# Patient Record
Sex: MALE | Race: WHITE | NOT HISPANIC OR LATINO
[De-identification: names, ages, dates, MRNs, and addresses within clinical notes are randomized per-mention and may not be internally consistent; named-entity substitution may affect disease eponyms.]

---

## 2017-05-05 ENCOUNTER — CHARTING TRANS (OUTPATIENT)
Dept: OTHER | Age: 27
End: 2017-05-05

## 2018-11-03 VITALS
TEMPERATURE: 99.7 F | SYSTOLIC BLOOD PRESSURE: 110 MMHG | DIASTOLIC BLOOD PRESSURE: 80 MMHG | RESPIRATION RATE: 16 BRPM | HEART RATE: 76 BPM

## 2019-05-09 ENCOUNTER — HOSPITAL (OUTPATIENT)
Dept: OTHER | Age: 29
End: 2019-05-09
Attending: NURSE PRACTITIONER

## 2019-05-09 LAB
AMORPH SED URNS QL MICRO: ABNORMAL
ANALYZER ANC (IANC): ABNORMAL
ANION GAP SERPL CALC-SCNC: 13 MMOL/L (ref 10–20)
APPEARANCE UR: ABNORMAL
APPEARANCE UR: CLEAR
BACTERIA #/AREA URNS HPF: ABNORMAL /HPF
BASOPHILS # BLD: 0 THOUSAND/MCL (ref 0–0.3)
BASOPHILS NFR BLD: 1 %
BILIRUB UR QL STRIP: ABNORMAL
BILIRUB UR QL: NEGATIVE
BUN SERPL-MCNC: 11 MG/DL (ref 6–20)
BUN/CREAT SERPL: 10 (ref 7–25)
CALCIUM SERPL-MCNC: 9.1 MG/DL (ref 8.4–10.2)
CAOX CRY URNS QL MICRO: ABNORMAL
CHLORIDE: 99 MMOL/L (ref 98–107)
CO2 SERPL-SCNC: 27 MMOL/L (ref 21–32)
COLOR UR: YELLOW
COLOR UR: YELLOW
CREAT SERPL-MCNC: 1.13 MG/DL (ref 0.67–1.17)
CRP SERPL-MCNC: 2 MG/DL
DIFFERENTIAL METHOD BLD: ABNORMAL
EOSINOPHIL # BLD: 0 THOUSAND/MCL (ref 0.1–0.5)
EOSINOPHIL NFR BLD: 0 %
EPITH CASTS #/AREA URNS LPF: ABNORMAL /[LPF]
ERYTHROCYTE [DISTWIDTH] IN BLOOD: 12.7 % (ref 11–15)
FATTY CASTS #/AREA URNS LPF: ABNORMAL /[LPF]
GLUCOSE SERPL-MCNC: 109 MG/DL (ref 65–99)
GLUCOSE UR STRIP-MCNC: NEGATIVE MG/DL
GLUCOSE UR-MCNC: NEGATIVE MG/DL
GRAN CASTS #/AREA URNS LPF: ABNORMAL /[LPF]
HEMATOCRIT: 47.7 % (ref 39–51)
HEMOCCULT STL QL: NEGATIVE
HGB BLD-MCNC: 16.1 GM/DL (ref 13–17)
HGB UR QL: NEGATIVE
HYALINE CASTS #/AREA URNS LPF: ABNORMAL /[LPF]
IMM GRANULOCYTES # BLD AUTO: 0 THOUSAND/MCL (ref 0–0.2)
IMM GRANULOCYTES NFR BLD: 0 %
KETONES UR STRIP-MCNC: NEGATIVE MG/DL
KETONES UR-MCNC: NEGATIVE MG/DL
LEUKOCYTE ESTERASE UR QL STRIP: NEGATIVE
LEUKOCYTE ESTERASE UR QL STRIP: NEGATIVE
LIPASE SERPL-CCNC: 100 UNIT/L (ref 73–393)
LYMPHOCYTES # BLD: 0.6 THOUSAND/MCL (ref 1–4.8)
LYMPHOCYTES NFR BLD: 7 %
MCH RBC QN AUTO: 31 PG (ref 26–34)
MCHC RBC AUTO-ENTMCNC: 33.8 GM/DL (ref 32–36.5)
MCV RBC AUTO: 91.9 FL (ref 78–100)
MIXED CELL CASTS #/AREA URNS LPF: ABNORMAL /[LPF]
MONOCYTES # BLD: 1.2 THOUSAND/MCL (ref 0.3–0.9)
MONOCYTES NFR BLD: 15 %
MUCOUS THREADS URNS QL MICRO: PRESENT
NEUTROPHILS # BLD: 6.2 THOUSAND/MCL (ref 1.8–7.7)
NEUTROPHILS NFR BLD: 77 %
NEUTS SEG NFR BLD: ABNORMAL %
NITRITE UR QL STRIP: NEGATIVE
NITRITE UR QL: NEGATIVE
NRBC (NRBCRE): 0 /100 WBC
PH UR STRIP: 5.5 UNIT (ref 5–7)
PH UR: 5 UNIT (ref 5–7)
PLATELET # BLD: 175 THOUSAND/MCL (ref 140–450)
POTASSIUM SERPL-SCNC: 4 MMOL/L (ref 3.4–5.1)
PROT UR QL: 30 MG/DL
PROT UR STRIP-MCNC: 30 MG/DL
RBC # BLD: 5.19 MILLION/MCL (ref 4.5–5.9)
RBC #/AREA URNS HPF: ABNORMAL /HPF (ref 0–2)
RBC CASTS #/AREA URNS LPF: ABNORMAL /[LPF]
RENAL EPI CELLS #/AREA URNS HPF: ABNORMAL /[HPF]
SODIUM SERPL-SCNC: 135 MMOL/L (ref 135–145)
SP GR UR STRIP: 1.02 (ref 1–1.03)
SP GR UR: 1.03 (ref 1–1.03)
SPECIMEN SOURCE: ABNORMAL
SPERM URNS QL MICRO: ABNORMAL
SQUAMOUS #/AREA URNS HPF: ABNORMAL /HPF (ref 0–5)
T VAGINALIS URNS QL MICRO: ABNORMAL
TRI-PHOS CRY URNS QL MICRO: ABNORMAL
URATE CRY URNS QL MICRO: ABNORMAL
URINE REFLEX: ABNORMAL
URNS CMNT MICRO: ABNORMAL
UROBILINOGEN UR QL: 0.2 MG/DL (ref 0–1)
UROBILINOGEN UR STRIP-MCNC: 0.2 MG/DL (ref 0–1)
WAXY CASTS #/AREA URNS LPF: ABNORMAL /[LPF]
WBC # BLD: 8.1 THOUSAND/MCL (ref 4.2–11)
WBC #/AREA URNS HPF: ABNORMAL /HPF (ref 0–5)
WBC CASTS #/AREA URNS LPF: ABNORMAL /[LPF]
YEAST HYPHAE URNS QL MICRO: ABNORMAL
YEAST URNS QL MICRO: ABNORMAL

## 2019-10-22 ENCOUNTER — OFFICE VISIT (OUTPATIENT)
Dept: URGENT CARE | Facility: CLINIC | Age: 29
End: 2019-10-22
Payer: COMMERCIAL

## 2019-10-22 VITALS
RESPIRATION RATE: 14 BRPM | HEART RATE: 65 BPM | TEMPERATURE: 99 F | OXYGEN SATURATION: 99 % | HEIGHT: 73 IN | SYSTOLIC BLOOD PRESSURE: 131 MMHG | BODY MASS INDEX: 23.86 KG/M2 | WEIGHT: 180 LBS | DIASTOLIC BLOOD PRESSURE: 76 MMHG

## 2019-10-22 DIAGNOSIS — Z20.2 POSSIBLE EXPOSURE TO STD: Primary | ICD-10-CM

## 2019-10-22 LAB
BILIRUB UR QL STRIP: NEGATIVE
C TRACH DNA SPEC QL NAA+PROBE: DETECTED
GLUCOSE UR QL STRIP: NEGATIVE
KETONES UR QL STRIP: POSITIVE
LEUKOCYTE ESTERASE UR QL STRIP: NEGATIVE
N GONORRHOEA DNA SPEC QL NAA+PROBE: NOT DETECTED
PH, POC UA: 6 (ref 5–8)
POC BLOOD, URINE: NEGATIVE
POC NITRATES, URINE: NEGATIVE
PROT UR QL STRIP: NEGATIVE
SP GR UR STRIP: 1.02 (ref 1–1.03)
UROBILINOGEN UR STRIP-ACNC: NORMAL (ref 0.3–2.2)

## 2019-10-22 PROCEDURE — 99000 SPECIMEN HANDLING OFFICE-LAB: CPT | Mod: S$GLB,,, | Performed by: EMERGENCY MEDICINE

## 2019-10-22 PROCEDURE — 86703 HIV-1/HIV-2 1 RESULT ANTBDY: CPT

## 2019-10-22 PROCEDURE — 87491 CHLMYD TRACH DNA AMP PROBE: CPT

## 2019-10-22 PROCEDURE — 99000 PR SPECIMEN HANDLING,DR OFF->LAB: ICD-10-PCS | Mod: S$GLB,,, | Performed by: EMERGENCY MEDICINE

## 2019-10-22 PROCEDURE — 87340 HEPATITIS B SURFACE AG IA: CPT

## 2019-10-22 PROCEDURE — 81003 URINALYSIS AUTO W/O SCOPE: CPT | Mod: QW,S$GLB,, | Performed by: EMERGENCY MEDICINE

## 2019-10-22 PROCEDURE — 86592 SYPHILIS TEST NON-TREP QUAL: CPT

## 2019-10-22 PROCEDURE — 99203 PR OFFICE/OUTPT VISIT, NEW, LEVL III, 30-44 MIN: ICD-10-PCS | Mod: S$GLB,,, | Performed by: EMERGENCY MEDICINE

## 2019-10-22 PROCEDURE — 87086 URINE CULTURE/COLONY COUNT: CPT

## 2019-10-22 PROCEDURE — 81003 POCT URINALYSIS, DIPSTICK, AUTOMATED, W/O SCOPE: ICD-10-PCS | Mod: QW,S$GLB,, | Performed by: EMERGENCY MEDICINE

## 2019-10-22 PROCEDURE — 99203 OFFICE O/P NEW LOW 30 MIN: CPT | Mod: S$GLB,,, | Performed by: EMERGENCY MEDICINE

## 2019-10-22 PROCEDURE — 3008F BODY MASS INDEX DOCD: CPT | Mod: CPTII,S$GLB,, | Performed by: EMERGENCY MEDICINE

## 2019-10-22 PROCEDURE — 86803 HEPATITIS C AB TEST: CPT

## 2019-10-22 PROCEDURE — 36415 PR COLLECTION VENOUS BLOOD,VENIPUNCTURE: ICD-10-PCS | Mod: S$GLB,,, | Performed by: EMERGENCY MEDICINE

## 2019-10-22 PROCEDURE — 36415 COLL VENOUS BLD VENIPUNCTURE: CPT | Mod: S$GLB,,, | Performed by: EMERGENCY MEDICINE

## 2019-10-22 PROCEDURE — 3008F PR BODY MASS INDEX (BMI) DOCUMENTED: ICD-10-PCS | Mod: CPTII,S$GLB,, | Performed by: EMERGENCY MEDICINE

## 2019-10-22 RX ORDER — DEXTROAMPHETAMINE SACCHARATE, AMPHETAMINE ASPARTATE MONOHYDRATE, DEXTROAMPHETAMINE SULFATE AND AMPHETAMINE SULFATE 2.5; 2.5; 2.5; 2.5 MG/1; MG/1; MG/1; MG/1
10 CAPSULE, EXTENDED RELEASE ORAL EVERY MORNING
COMMUNITY

## 2019-10-22 RX ORDER — AZITHROMYCIN 500 MG/1
500 TABLET, FILM COATED ORAL ONCE
Qty: 2 TABLET | Refills: 0 | Status: SHIPPED | OUTPATIENT
Start: 2019-10-22 | End: 2019-10-22

## 2019-10-22 NOTE — PATIENT INSTRUCTIONS
Reji Zamorano MD  Go to the Emergency Department for any problems  Call your PCP for follow up next available.    Chlamydia Trachomatis (Urine)  Does this test have other names?  No.  What is this test?  This test looks for Chlamydia trachomatis bacteria in a sample of cells collected by your healthcare provider.  C. trachomatis bacteria cause chlamydia. Chlamydia is the most common sexually transmitted disease (STD) in the [place]  The CDC recommends that sexually active women 25 and younger be screened once a year for chlamydia. That's because as many as half of women who get chlamydia don't have any symptoms. Men should be tested as soon as they have symptoms or if their partners are diagnosed with chlamydia.  In women, chlamydia may lead to cervicitis, an inflammation and swelling of the cervix. If it isn't treated, it can lead to serious sexual health problems, including infertility.  In men, chlamydia can cause urethritis. This is a swelling of the urethra and possibly blood in the urine. Babies born to infected mothers can get pneumonia or conjunctivitis. The mothers can get endometriosis later on.  Chlamydia can be effectively treated with antibiotics.  Why do I need this test?  You may have this test if you are a sexually active woman 25 or younger or a man whose partner has been diagnosed with chlamydia.  When symptoms occur in women, they can include:  · Abnormal vaginal bleeding or discharge  · Stomach pain  · Pain during sex  · Pain when passing urine  When symptoms occur in men, they can include:  · Watery discharge from your penis that's not urine  · Pain when passing urine  · Swollen scrotum  · Painful sensation in your testicles  What other tests might I have along with this test?  Your healthcare provider may also order other tests because chlamydia symptoms can be confused with symptoms of other STDs. These STDs include:  · Gonorrhea   · HIV/AIDS  · Hepatitis B  · Trichomoniasis  · Syphilis  What do  "my test results mean?  Many things may affect your lab test results. These include the method each lab uses to do the test. Even if your test results are different from the normal value, you may not have a problem. To learn what the results mean for you, talk with your healthcare provider.  Normal results are negative, meaning that no chlamydia cells were found in your urine.  A positive result means that chlamydia bacteria were found and that you are likely infected with the disease.  How is this test done?  This test requires a "first void" urine sample: You will collect your first urine in the morning after you wake up.  Does this test pose any risks?  This test poses no known risks.  What might affect my test results?  Other factors aren't likely to affect your results.  How do I get ready for this test?  You don't need to prepare for this test.     © 0967-4601 [employer]. [address] [place] [address]. All rights reserved. This information is not intended as a substitute for professional medical care. Always follow your healthcare professional's instructions.        Gonorrhea (Urine)  Does this test have other names?  Neisseria gonorrhoeae, N. gonorrhoeae  What is this test?  This urine test helps find out whether you are infected with gonorrhea. Gonorrhea is a common sexually transmitted disease (STD). Symptoms of gonorrhea may be mild at first, but the infection can be serious if not treated. It can damage organs, cause infertility in women and some men, and even lead to a life-threatening bacterial infection.  Cases of gonorrhea have declined in the [place] in recent years. But it remains a concern because some bacteria have become resistant to common antibiotics. Risk factors for gonorrhea include having unprotected oral sex, rectal sex, or sex with a partner who has gonorrhea. Other risks include having multiple sexual partners, a new sexual partner, or a gonorrhea infection in the " past.   Gonorrhea is easily treated with antibiotics. A one-time dose generally cures the infection in both men and women.   Why do I need this test?  You may have this test if your healthcare provider suspects that you have gonorrhea. The symptoms of gonorrhea depend on where you have the infection. In both men and women, gonorrhea can occur in the urethra, where urine comes out. Or it can occur in the rectum or in the throat.  Gonorrhea is easily cured, but it can be dangerous and even life-threatening if not treated. Among other things, it can cause a potentially fatal blood infection in women and men. It often causes pelvic inflammatory disease in women, which can lead to infertility and chronic pelvic pain. In men the infection often affects the prostate. It also often affects the epididymis, the ducts attached to the testicles. It can also cause infertility in men.  In women, symptoms of gonorrhea include:  · Increased vaginal discharge  · Burning sensation when urinating  · Bleeding or spotting between periods  · Pelvic pain  In men, symptoms of gonorrhea include:  · Green, white, or yellow discharge from the penis  · Burning sensation when urinating  · Painful or swollen testicles  In both men and women, symptoms of anal gonorrhea include anal itching, soreness, bleeding, and painful bowel movements. Most women with gonorrhea do not have any symptoms. Even when a woman has symptoms, they are often mild. They can be mistaken for a bladder or vaginal infection. Some men with gonorrhea may have no symptoms at all.    If you're pregnant, you may have this test as part of routine prenatal testing. A pregnant woman who has gonorrhea can pass the infection to her baby during delivery. This may cause blindness or a potentially fatal blood infection. Finding and treating gonorrhea prevents such problems.   What other tests might I have along with this test?  You may also be tested for other STDs,  including:  · Chlamydia  · Hepatitis B  · HIV  · Syphilis  · Trichomoniasis   What do my test results mean?  Many things may affect your lab test results. These include the method each lab uses to do the test. Even if your test results are different from the normal value, you may not have a problem. To learn what the results mean for you, talk with your healthcare provider.   How is this test done?  This test requires a urine sample. The sample is usually collected by urinating in a specimen cup at your healthcare provider's office.   Does this test pose any risks?  This test poses no known risks.  What might affect my test results?  If you use genital lubricants or disinfectants before the test, your results might not be accurate.  How do I get ready for this test?  Ask your healthcare provider how to prepare for this test. In addition, be sure your provider knows about all medicines, herbs, vitamins, and supplements you are taking. This includes medicines that don't need a prescription and any illicit drugs you may use.   © 3039-4169 [employer]. [address] [place] [address]. All rights reserved. This information is not intended as a substitute for professional medical care. Always follow your healthcare professional's instructions.        What Are Sexually Transmitted Diseases (STDs)?  A sexually transmitted disease (STD) is a disease that is spread during sex. (An STD can also be called STI for sexually transmitted infection.) You can become infected with an STD if you have sex with someone who has an STD. Any sex that involves the penis, vagina, anus, or mouth can spread disease. Some STDs spread through body fluids such as semen, vaginal fluid, or blood. Others spread through contact with affected skin.  Who is at risk?     Places on or in the body where STDs cause damage include reproductive organs, the rectum, and the mouth.   It doesnt matter if youre straight or dodge, male or  female, young or old. Any person who has sex can get an STD. Your risk increases if:  · You have more than one partner. The more partners you have, the greater your risk.  · Your partner has other partners. If your partner is exposed to an STD, you could be, too.  · You or your partner have had sex with other people in the past. Either of you might be carrying an STD from an earlier partner.  · You have an STD. The STD may cause sores or other health problems that increase your risk of new infections. Your risk will stay high unless you change the behaviors that put you at risk of the current infection.  Prevent future problems  Left untreated, certain STDs can lead to cancer or even death. Some can harm unborn babies whose mothers are infected. Others can cause you to not be able to have children (sterility) or can affect changes in behavior or your ability to think. You can prevent these problems with safer sex, regular checkups, and early treatment. Always use a latex condom when you have sex. Get tested if youre at risk. And get treated early if you have an STD.  Getting checked  The only sure way to know if you have an STD is to get checked by a healthcare provider. If you notice a change in how your body looks or feels, have it checked out. But keep in mind, STDs dont always show symptoms. So if youre at risk of STDs, get checked regularly. If you find you have an STD, be sure your partner gets treatment, too. If not, his or her health is at risk. And left untreated, your partner could pass the STD back to you, or on to others.  Common symptoms  Be alert to any changes in your body and your partners body. Symptoms may appear in or near the vagina, penis, rectum, mouth, or throat. They include:  · Unusual discharge  · Lumps, bumps, or rashes  · Sores that may be painful, itchy, or painless  · Itchy skin  · Burning with urination  · Pain in the pelvis, belly (abdomen), or rectum  Even if you dont have  symptoms  You may have an STD, even if you dont have symptoms. If you think you are at risk, get checked. Go to a clinic or to your healthcare provider. If your partner has an STD, you need to be tested too, even if you feel fine.  Vaccines to prevent disease  Vaccines (also called immunizations) are available to prevent hepatitis A and hepatitis B. These are two kinds of STDs. There is also a vaccine to prevent HPV. This is a virus that can be passed from person to person through sexual contact. Ask your healthcare provider whether any of these vaccines is right for you.   Date Last Reviewed: 11/1/2016  © 4190-7277 [employer]. [address] [place] [address], [place] [address]. All rights reserved. This information is not intended as a substitute for professional medical care. Always follow your healthcare professional's instructions.        If You Think You Have an STD  Treating a sexually transmitted disease (STD) early limits the problems they can cause. If you have an STD, get treated right away. Ask your partner to be tested, too. Then avoid sex until youve finished treatment and your healthcare provider says its OK to have sex again.    Follow your treatment plan  Treatment depends on the type of STD you have. Common treatments include injections and oral pills or liquids. Creams and gels can be applied to sores caused by certain STDs. Follow the tips below:  · Get new treatment for each new STD.  · Dont use old medicine, even for the same STD. Use medicines as directed.  · Dont share medicine unless instructed to do so by your healthcare provider or clinic.  Talk to your partner  If you have an STD, its your duty to tell all your recent partners so they can be tested and treated. This is one important way to prevent the disease from being spread. Telling a partner that you have an STD can be hard. You may be embarrassed, angry, or afraid. Its often unclear who had the STD first. So try not to  place blame. Your healthcare provider may offer some advice on how to begin.  Prevent future problems  Even after youve been treated, you can still be infected again. This is a common problem. It happens when a partner passes the STD back to you. To avoid this, your partner must be tested. He or she may also need treatment. After treatment, go to any scheduled follow-up visits. Then prevent future problems with safer sex. Limit your number of partners and always use a latex condom.  Diagnosing STDS  Your healthcare provider will take a health history and examine you. During your health history, you will be asked about your sex habits and health history. You may also be asked about drug use. Give honest answers. Your healthcare provider will then check your body for signs of STDs. He or she also may perform one or more of the following tests:  · Fluid is swabbed from any sores. Samples also may be taken from the vagina, penis, mouth, or rectum. The samples are then tested for STDs.  · Blood or urine samples may be taken. They are checked for viruses or bacteria that cause STDs.  · For women, cells from the cervix (where the vagina and uterus meet) are checked for signs of cancer. This is called a Pap smear. If cell changes are found, a magnifying scope may be used to take a closer look (colposcopy).   Date Last Reviewed: 12/1/2016 © 2000-2017 [employer]. [address] [place] [address]. All rights reserved. This information is not intended as a substitute for professional medical care. Always follow your healthcare professional's instructions.        Understanding STDs    When it comes to sex, nothing is risk-free. Any sexual contact with the penis, vagina, anus, or mouth can spread a sexually transmitted disease (STD). The only sure way to prevent STDs is abstinence (not having sex). But there are ways to make sex safer. Use a latex condom each time you have sex. And choose your partner  wisely.  Use condoms for safer sex  If you have sex, latex condoms provide the best protection against STDs. Latex condoms stop the exchange of body fluids that carry certain STDs. They also limit contact with affected skin. Be aware though, a condom doesnt cover all skin. So, affected skin that is not covered can still transfer disease. But youre safer with a condom than without one. Use a condom even if you use other birth control. While birth control methods like the pill or IUD help prevent pregnancy, they do not protect against STDs.  Choose the right condom  Condoms made of latex prevent disease best. If youre allergic to latex, use polyurethane condoms instead. Male condoms fit over the penis. Female condoms line the vagina. Before buying a condom, read the label to be sure it prevents disease. Some novelty condoms dont.  The right lubricant helps  Buy lubricated condoms or use lubricant. This provides greater comfort and reduces the risk of condom breakage. Use only water-based lubricants. Dont use oil, lotion, or petroleum jelly. They can weaken the condom, causing breakage. Also, you may want to choose lubricants without nonoxynol-9. Its now known that this spermicide does not prevent disease and may cause irritation.  Use condoms correctly  For condoms to work, they must be used the right way. Keep these tips in mind:  · Use a new latex condom each time you have sex. Slip the condom on the penis before any contact is made.  · When ready to withdraw, hold the rim of the condom as the penis pulls out. This prevents the condom from slipping off.  · Check the expiration date before using a condom.  · Dont store condoms in places that can get hot, such as a car or a wallet that is carried in a back pocket.  Get to know your partner  Safer sex is a process. It involves getting to know your partner and making informed choices. Ask each other how many partners you have had in the past, and how many you  have now. Find out if either of you has an STD. If you decide to have sex, use a condom each time. Dont stop using condoms unless youre sure neither of you has other partners and youve both been tested to confirm you dont have STDs. Then stay free of disease by having sex only with each other (monogamy).  Keep your cool  Dont let alcohol or drugs cloud your judgment. They could lead you to have sex with someone you wouldnt have chosen if you were sober. Or, you might forget to use a condom. If you do plan to have sex, keep a latex condom with you. Dont wait until youre in the heat of passion to try to find one.  Consider abstinence  The only way to be sure you wont get an STD is to abstain from sex. Abstinence is a choice that many people make at some point in their lives. Maybe you want to wait until you are sure youre ready before you have sex. Maybe youd like a break from the responsibilities of sex for a while. Or maybe you just want to know your partner better before taking the next step. Abstinence is a choice you can make now to protect your future.  Date Last Reviewed: 12/1/2016  © 9619-3808 [employer]. [address] [place] [address], [place] [address]. All rights reserved. This information is not intended as a substitute for professional medical care. Always follow your healthcare professional's instructions.

## 2019-10-22 NOTE — PROGRESS NOTES
"Subjective:       Patient ID: Brandin Berg is a 29 y.o. male.    Vitals:  height is 6' 1" (1.854 m) and weight is 81.6 kg (180 lb). His oral temperature is 98.8 °F (37.1 °C). His blood pressure is 131/76 and his pulse is 65. His respiration is 14 and oxygen saturation is 99%.     Chief Complaint: Exposure to STD    Patient was exposed to Chlamydia.  Received a phone call from his girl friend, denies hx STI, no penile pain/DC/dysuria, no symptoms.    Exposure to STD   The patient's pertinent negatives include no genital injury, genital itching, genital lesions, pelvic pain, penile discharge, penile pain, priapism, scrotal swelling or testicular pain. This is a new problem. The current episode started today. The problem occurs rarely. The problem has been unchanged. The patient is experiencing no pain. Pertinent negatives include no abdominal pain, anorexia, chest pain, chills, constipation, coughing, diarrhea, discolored urine, dysuria, fever, flank pain, frequency, headaches, hematuria, hesitancy, joint pain, joint swelling, nausea, painful intercourse, rash, shortness of breath, sore throat, urgency, urinary retention or vomiting. Nothing aggravates the symptoms. He has tried nothing for the symptoms. He is sexually active. Yes, his partner has an STD. There is no history of BPH, chlamydia, cryptorchidism, erectile aid use, erectile dysfunction, a femoral hernia, gonorrhea, herpes simplex, HIV, an inguinal hernia, kidney stones, prostatitis, sickle cell disease, syphilis or varicocele.       Constitution: Negative for chills and fever.   HENT: Negative for sore throat.    Neck: Negative for painful lymph nodes.   Cardiovascular: Negative for chest pain.   Respiratory: Negative for cough and shortness of breath.    Gastrointestinal: Negative for abdominal pain, nausea, vomiting, constipation and diarrhea.   Genitourinary: Negative for dysuria, frequency, urgency, urine decreased, flank pain, hematuria, history of " kidney stones, genital trauma, painful intercourse, genital sore, penile discharge, painful ejaculation, penile pain, penile swelling, scrotal swelling, testicular pain and pelvic pain.   Musculoskeletal: Negative for back pain.   Skin: Negative for rash and lesion.   Neurological: Negative for headaches.   Hematologic/Lymphatic: Negative for swollen lymph nodes.       Objective:      Physical Exam   Constitutional: He is oriented to person, place, and time. He appears well-developed and well-nourished.   HENT:   Head: Normocephalic and atraumatic.   Cardiovascular: Normal rate, regular rhythm and normal heart sounds.   Pulmonary/Chest: Breath sounds normal.   Abdominal: Soft. There is no tenderness.   Genitourinary:   Genitourinary Comments: Deferred per pt request   Musculoskeletal: Normal range of motion.   Neurological: He is alert and oriented to person, place, and time.   Skin: Skin is warm and dry.   Psychiatric: He has a normal mood and affect. His behavior is normal.         Assessment:       1. Possible exposure to STD        Plan:         Possible exposure to STD  -     Urine culture  -     POCT Urinalysis, Dipstick, Automated, W/O Scope  -     C. trachomatis/N. gonorrhoeae by AMP DNA  -     Hepatitis B surface antigen  -     Hepatitis C antibody  -     HIV 1/2 Ag/Ab (4th Gen)  -     RPR  -     azithromycin (ZITHROMAX) 500 MG tablet; Take 1 tablet (500 mg total) by mouth once. Take 2 tablets at once. for 1 dose  Dispense: 2 tablet; Refill: 0         Reji Zamorano MD  Go to the Emergency Department for any problems  Call your PCP for follow up next available.

## 2019-10-23 ENCOUNTER — TELEPHONE (OUTPATIENT)
Dept: URGENT CARE | Facility: CLINIC | Age: 29
End: 2019-10-23

## 2019-10-23 LAB
BACTERIA UR CULT: NO GROWTH
HBV SURFACE AG SERPL QL IA: NEGATIVE
HCV AB SERPL QL IA: NEGATIVE
HIV 1+2 AB+HIV1 P24 AG SERPL QL IA: NEGATIVE
RPR SER QL: NORMAL

## 2019-10-23 NOTE — TELEPHONE ENCOUNTER
Patient given POS chalmydia result. He has taken Azith as prescribed. No symptoms. Answered all questions.